# Patient Record
Sex: FEMALE | Race: WHITE
[De-identification: names, ages, dates, MRNs, and addresses within clinical notes are randomized per-mention and may not be internally consistent; named-entity substitution may affect disease eponyms.]

---

## 2021-12-17 ENCOUNTER — HOSPITAL ENCOUNTER (EMERGENCY)
Dept: HOSPITAL 47 - EC | Age: 81
Discharge: OTHER INSTITUTION | End: 2021-12-17
Payer: COMMERCIAL

## 2021-12-17 VITALS — DIASTOLIC BLOOD PRESSURE: 74 MMHG | HEART RATE: 68 BPM | RESPIRATION RATE: 28 BRPM | SYSTOLIC BLOOD PRESSURE: 143 MMHG

## 2021-12-17 VITALS — TEMPERATURE: 95.5 F

## 2021-12-17 DIAGNOSIS — I47.2: ICD-10-CM

## 2021-12-17 DIAGNOSIS — I10: ICD-10-CM

## 2021-12-17 DIAGNOSIS — K55.8: ICD-10-CM

## 2021-12-17 DIAGNOSIS — Z20.822: ICD-10-CM

## 2021-12-17 DIAGNOSIS — D72.829: ICD-10-CM

## 2021-12-17 DIAGNOSIS — I71.4: Primary | ICD-10-CM

## 2021-12-17 DIAGNOSIS — E87.2: ICD-10-CM

## 2021-12-17 LAB
ALBUMIN SERPL-MCNC: 2 G/DL (ref 3.5–5)
ALP SERPL-CCNC: 59 U/L (ref 38–126)
ALT SERPL-CCNC: 20 U/L (ref 4–34)
ANION GAP SERPL CALC-SCNC: 18 MMOL/L
APTT BLD: 35.5 SEC (ref 22–30)
AST SERPL-CCNC: 20 U/L (ref 14–36)
BASOPHILS # BLD AUTO: 0.1 K/UL (ref 0–0.2)
BASOPHILS NFR BLD AUTO: 0 %
BUN SERPL-SCNC: 14 MG/DL (ref 7–17)
CALCIUM SPEC-MCNC: 7.4 MG/DL (ref 8.4–10.2)
CHLORIDE SERPL-SCNC: 113 MMOL/L (ref 98–107)
CO2 BLDA-SCNC: 14 MMOL/L (ref 19–24)
CO2 SERPL-SCNC: 7 MMOL/L (ref 22–30)
EOSINOPHIL # BLD AUTO: 0 K/UL (ref 0–0.7)
EOSINOPHIL NFR BLD AUTO: 0 %
ERYTHROCYTE [DISTWIDTH] IN BLOOD BY AUTOMATED COUNT: 2.42 M/UL (ref 3.8–5.4)
ERYTHROCYTE [DISTWIDTH] IN BLOOD: 15.9 % (ref 11.5–15.5)
GLUCOSE BLD-MCNC: 316 MG/DL (ref 75–99)
GLUCOSE SERPL-MCNC: 276 MG/DL (ref 74–99)
HCO3 BLDA-SCNC: 13 MMOL/L (ref 21–25)
HCT VFR BLD AUTO: 23.3 % (ref 34–46)
HGB BLD-MCNC: 6.6 GM/DL (ref 11.4–16)
INR PPP: 1.2 (ref ?–1.2)
LIPASE SERPL-CCNC: 62 U/L (ref 23–300)
LYMPHOCYTES # SPEC AUTO: 3.4 K/UL (ref 1–4.8)
LYMPHOCYTES NFR SPEC AUTO: 20 %
MAGNESIUM SPEC-SCNC: 2.2 MG/DL (ref 1.6–2.3)
MCH RBC QN AUTO: 27.3 PG (ref 25–35)
MCHC RBC AUTO-ENTMCNC: 28.4 G/DL (ref 31–37)
MCV RBC AUTO: 96.2 FL (ref 80–100)
MONOCYTES # BLD AUTO: 0.9 K/UL (ref 0–1)
MONOCYTES NFR BLD AUTO: 5 %
NEUTROPHILS # BLD AUTO: 11.9 K/UL (ref 1.3–7.7)
NEUTROPHILS NFR BLD AUTO: 72 %
PCO2 BLDA: 30 MMHG (ref 35–45)
PH BLDA: 7.25 [PH] (ref 7.35–7.45)
PLATELET # BLD AUTO: 241 K/UL (ref 150–450)
PO2 BLDA: 390 MMHG (ref 83–108)
POTASSIUM SERPL-SCNC: 5.1 MMOL/L (ref 3.5–5.1)
PROT SERPL-MCNC: 4.4 G/DL (ref 6.3–8.2)
PT BLD: 12.4 SEC (ref 9–12)
SODIUM SERPL-SCNC: 138 MMOL/L (ref 137–145)
WBC # BLD AUTO: 16.5 K/UL (ref 3.8–10.6)

## 2021-12-17 PROCEDURE — 86850 RBC ANTIBODY SCREEN: CPT

## 2021-12-17 PROCEDURE — 96376 TX/PRO/DX INJ SAME DRUG ADON: CPT

## 2021-12-17 PROCEDURE — 99292 CRITICAL CARE ADDL 30 MIN: CPT

## 2021-12-17 PROCEDURE — 96375 TX/PRO/DX INJ NEW DRUG ADDON: CPT

## 2021-12-17 PROCEDURE — 83690 ASSAY OF LIPASE: CPT

## 2021-12-17 PROCEDURE — 96365 THER/PROPH/DIAG IV INF INIT: CPT

## 2021-12-17 PROCEDURE — 36600 WITHDRAWAL OF ARTERIAL BLOOD: CPT

## 2021-12-17 PROCEDURE — 85730 THROMBOPLASTIN TIME PARTIAL: CPT

## 2021-12-17 PROCEDURE — 70450 CT HEAD/BRAIN W/O DYE: CPT

## 2021-12-17 PROCEDURE — 72125 CT NECK SPINE W/O DYE: CPT

## 2021-12-17 PROCEDURE — 82272 OCCULT BLD FECES 1-3 TESTS: CPT

## 2021-12-17 PROCEDURE — 85025 COMPLETE CBC W/AUTO DIFF WBC: CPT

## 2021-12-17 PROCEDURE — 80053 COMPREHEN METABOLIC PANEL: CPT

## 2021-12-17 PROCEDURE — 93005 ELECTROCARDIOGRAM TRACING: CPT

## 2021-12-17 PROCEDURE — 82805 BLOOD GASES W/O2 SATURATION: CPT

## 2021-12-17 PROCEDURE — 83735 ASSAY OF MAGNESIUM: CPT

## 2021-12-17 PROCEDURE — 86901 BLOOD TYPING SEROLOGIC RH(D): CPT

## 2021-12-17 PROCEDURE — 85610 PROTHROMBIN TIME: CPT

## 2021-12-17 PROCEDURE — 72170 X-RAY EXAM OF PELVIS: CPT

## 2021-12-17 PROCEDURE — 36415 COLL VENOUS BLD VENIPUNCTURE: CPT

## 2021-12-17 PROCEDURE — 99291 CRITICAL CARE FIRST HOUR: CPT

## 2021-12-17 PROCEDURE — 74174 CTA ABD&PLVS W/CONTRAST: CPT

## 2021-12-17 PROCEDURE — 71045 X-RAY EXAM CHEST 1 VIEW: CPT

## 2021-12-17 PROCEDURE — 84484 ASSAY OF TROPONIN QUANT: CPT

## 2021-12-17 PROCEDURE — 86900 BLOOD TYPING SEROLOGIC ABO: CPT

## 2021-12-17 PROCEDURE — 87635 SARS-COV-2 COVID-19 AMP PRB: CPT

## 2021-12-17 PROCEDURE — 82271 OCCULT BLOOD OTHER SOURCES: CPT

## 2021-12-17 PROCEDURE — 83605 ASSAY OF LACTIC ACID: CPT

## 2021-12-17 PROCEDURE — 86920 COMPATIBILITY TEST SPIN: CPT

## 2021-12-17 RX ADMIN — MIDAZOLAM HYDROCHLORIDE PRN MG: 1 INJECTION, SOLUTION INTRAMUSCULAR; INTRAVENOUS at 17:51

## 2021-12-17 RX ADMIN — MIDAZOLAM HYDROCHLORIDE PRN MG: 1 INJECTION, SOLUTION INTRAMUSCULAR; INTRAVENOUS at 16:42

## 2021-12-17 NOTE — ED
GI Bleed HPI





- General


Chief complaint: GI Bleed


Stated complaint: Unresponsive,GI Bleed


Source: EMS


Mode of arrival: EMS





- History of Present Illness


Initial comments: 


81 year old female presents with past medical history of hypertension and 

cataracts who presents to the emergency department.  She called EMS for 

shortness of breath.  EMS arrived on scene and found a sick-appearing female 

that had a bloody emesis on her shirt.  She stood up from her chair and promptly

went unresponsive.  They got her in the back of the ambulance and she was found 

to be bradycardic and hypotensive.  She was completely unresponsive and 

therefore was intubated using an 8 cm ET tube, 25 cm at the lip.  She was given 

2 doses of push dose epi and a dose of atropine with improvement in her blood 

pressures.  She had seizure-like activity after intubation and therefore was 

given 5 mg of Versed by paramedic. She arrives to us with a pulse is placed on 

the vent.  Son presents to the hospital and states that he came home from work 

and found her on well on the couch.  He notes some abdominal pain.  She was 

having some vomiting which was grossly bloody.  She has not had any complaints 

previous to this.  No previous cardiac history.  The remainder of the HPI is 

limited








- Related Data


                                Home Medications











 Medication  Instructions  Recorded  Confirmed


 


Metoprolol Tartrate [Lopressor] 50 mg PO HS 07/14/16 12/17/21


 


cloNIDine HCL [Catapres] 0.2 mg PO BID 07/14/16 12/17/21


 


Ipratropium-Albuterol Nebulize 3 ml INHALATION RT-QID PRN 12/17/21 12/17/21





[Duoneb 0.5 mg-3 mg/3 ml Soln]   


 


amLODIPine [Norvasc] 5 mg PO HS 12/17/21 12/17/21











                                    Allergies











Allergy/AdvReac Type Severity Reaction Status Date / Time


 


procaine HCl [From Novocain] Allergy  passed out Verified 12/17/21 14:52





   after  





   novocain  





   in dentist  





   office  














Review of Systems


ROS Statement: 


Those systems with pertinent positive or pertinent negative responses have been 

documented in the HPI.





ROS Other: All systems not noted in ROS Statement are negative.





Past Medical History


Past Medical History: Eye Disorder, Hypertension


Additional Past Medical History / Comment(s): CATARACTS


History of Any Multi-Drug Resistant Organisms: None Reported


Past Surgical History: Tonsillectomy


Additional Past Surgical History / Comment(s): LUMP REMOVED FROM RIGHT ARM.  LT 

CATARACT REMOVED


Past Anesthesia/Blood Transfusion Reactions: No Reported Reaction


Additional Past Anesthesia/Blood Transfusion Reaction / Comment(s): PASSED OUT 

AFTER NOVOCAINE


Past Psychological History: No Psychological Hx Reported


Smoking Status: Unknown if ever smoked


Past Alcohol Use History: None Reported


Past Drug Use History: None Reported





- Past Family History


  ** Mother


Family Medical History: Diabetes Mellitus, Myocardial Infarction (MI)





  ** Father


Additional Family Medical History / Comment(s): AORTIC ANEURYSM





  ** Sister(s)


Family Medical History: Diabetes Mellitus





  ** Daughter(s)


Family Medical History: Diabetes Mellitus





General Exam


Limitations: altered mental status, physical limitation


General appearance: obtunded, in distress


Head exam: Present: atraumatic, normocephalic, normal inspection


Eye exam: Present: other (4 mm, non reactive)


Respiratory exam: Present: normal lung sounds bilaterally, other (bagged).  

Absent: respiratory distress, wheezes, rales, rhonchi, stridor


Cardiovascular Exam: Present: regular rate, irregular rhythm (multiple PVCs with

intermittent episodes of nonsustained V. tach), normal heart sounds.  Absent: 

systolic murmur, diastolic murmur, rubs, gallop, clicks


GI/Abdominal exam: Present: distended, tenderness, guarding, diminished bowel 

sounds


Rectal exam: Present: decreased rectal tone, heme (-) stool, hemorrhoids


Extremities exam: Present: normal inspection, full ROM, normal capillary refill.

 Absent: tenderness, pedal edema, joint swelling, calf tenderness


Neurological exam: Present: altered


Skin exam: Present: pallor





Course


                                   Vital Signs











  12/17/21 12/17/21 12/17/21





  14:35 14:48 14:53


 


Temperature  91.9 F L 93.6 F L


 


Pulse Rate 99 108 H 105 H


 


Respiratory 18 14 





Rate   


 


Blood Pressure 141/89 149/112 92/78


 


O2 Sat by Pulse 100  





Oximetry   














  12/17/21 12/17/21 12/17/21





  15:01 15:06 15:45


 


Temperature 94.5 F L 94.6 F L 94.3 F L


 


Pulse Rate 89 77 65


 


Respiratory 14 14 20





Rate   


 


Blood Pressure 99/80 112/63 93/52


 


O2 Sat by Pulse 99 100 100





Oximetry   














  12/17/21 12/17/21 12/17/21





  15:56 16:08 16:18


 


Temperature  94.5 F L 94.3 F L


 


Pulse Rate 65 65 63


 


Respiratory 20 20 22





Rate   


 


Blood Pressure 81/42 90/53 113/67


 


O2 Sat by Pulse 100 100 





Oximetry   














  12/17/21 12/17/21 12/17/21





  16:25 16:31 16:41


 


Temperature 94.3 F L 94.1 F L 94.1 F L


 


Pulse Rate 64 64 61


 


Respiratory 22 20 20





Rate   


 


Blood Pressure 110/65 109/66 125/74


 


O2 Sat by Pulse 100 100 100





Oximetry   














  12/17/21 12/17/21 12/17/21





  17:21 18:21 18:48


 


Temperature   95.5 F L


 


Pulse Rate 65 79 68


 


Respiratory 16 20 28 H





Rate   


 


Blood Pressure 106/71 112/66 143/74


 


O2 Sat by Pulse 100 100 100





Oximetry   














- Reevaluation(s)


Reevaluation #1: 


Speaking with Dr. Quezada - looking at images and will call back


12/17/21 1415


Reevaluation #2: 


Speaking with Dr. Quezada - does not believe the patient has a ruptured AAA 

but ischemic bowel.  Recommending general surgery consult or transfer to higher 

level of care ideally


12/17/21 1647








Reevaluation #3: 





12/17/21 17:39


Calling [facility] for transfer. 





12/17/21 17:52


Not accepting transfers


Reevaluation #4: 


Spoke with Dr. Chavez - states he will not operate on the patient without a 

vascular surgeon available and capable of handling ruptured AAA


12/17/21 17:54





Spoke with Dr. Quezada - believes this is a general surgery issue and needs to

be deferred to general surgeon





12/17/21 20:36





Reevaluation #5: 





12/17/21 18:28


[facility] called back - spoke with Dr. Wiley - patient will be transported to

[facility] CAT 1 





Medical Decision Making





- Medical Decision Making





On arrival patient is placed in trauma bay 1.  A thorough exam is performed.  

She does have stable blood pressure and pulse at this time.  A bedside ultrasoun

d is performed which does demonstrate an enlarged aorta measuring 5.0 cm. 

abdomen is tense.  Laboratory studies are conducted.  Patient did receive 2 L of

fluid prehospital.  Patient is type and crossmatch for 2 units.  OG tube was 

placed which does have 500 mL of dark bloody fluid return.  Patient does go for 

CT of her brain, cervical spine as well as chest abdomen pelvis angio.  Patient 

does have an episode of nonsustained V. tach.  She was given 150 mL bolus of 

amiodarone.  3 studies are obtained white count is 16.5.  Hemoccult and 6.6.  

INR 1.2.  CO2 is 7.  A lactic acid 14.2.  Stool occult is negative for blood.  

Gastric occult is positive.  CT of the brain and x-rays of acute renal hem

orrhage, mass effect or midline shift.  Patient does have some movement on the 

vent and therefore 2 mg of Versed is ordered every 2 hours.  CT of the patient's

abdomen and pelvis demonstrates abdominal aortic aneurysm rupture with abnormal 

heterogenicity outside the calcified rim of the AAA.  Internal air with an 

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  

Abnormal wall thickening prominence of small bowel loops throughout the abdomen 

into the pelvis concerning for developing ischemic change.  I did call and speak

with Dr. Quezada.  He feels that the patient is suffering from ischemic bowel 

and not ruptured AAA.  I spoke then with Dr. Chavez who is concerned for AAA.  

Both surgeons feel that the patient needs higher level of care. I did call and 

leave the patients info with Benji Dalal.  A call was then made to [facility] who 

refused patient.  I was on the line with Benoit when I did receive a call back

from Benji Dalal accepting the patient.  Accepting physician is Dr. Wiley and 

Dr. Syed - patient will go to [facility]. Sons updated as to poor prognosis. 





- Lab Data


Result diagrams: 


                                 12/17/21 14:35





                                 12/17/21 14:35


                                   Lab Results











  12/17/21 12/17/21 12/17/21 Range/Units





  14:35 14:35 14:35 


 


WBC  16.5 H    (3.8-10.6)  k/uL


 


RBC  2.42 L    (3.80-5.40)  m/uL


 


Hgb  6.6 L*    (11.4-16.0)  gm/dL


 


Hct  23.3 L    (34.0-46.0)  %


 


MCV  96.2    (80.0-100.0)  fL


 


MCH  27.3    (25.0-35.0)  pg


 


MCHC  28.4 L    (31.0-37.0)  g/dL


 


RDW  15.9 H    (11.5-15.5)  %


 


Plt Count  241    (150-450)  k/uL


 


MPV  8.4    


 


Neutrophils %  72    %


 


Lymphocytes %  20    %


 


Monocytes %  5    %


 


Eosinophils %  0    %


 


Basophils %  0    %


 


Neutrophils #  11.9 H    (1.3-7.7)  k/uL


 


Lymphocytes #  3.4    (1.0-4.8)  k/uL


 


Monocytes #  0.9    (0-1.0)  k/uL


 


Eosinophils #  0.0    (0-0.7)  k/uL


 


Basophils #  0.1    (0-0.2)  k/uL


 


Hypochromasia  Marked    


 


PT   12.4 H   (9.0-12.0)  sec


 


INR   1.2 H   (<1.2)  


 


APTT   35.5 H   (22.0-30.0)  sec


 


Sample Site     


 


ABG pH     (7.35-7.45)  


 


ABG pCO2     (35-45)  mmHg


 


ABG pO2     ()  mmHg


 


ABG HCO3     (21-25)  mmol/L


 


ABG Total CO2     (19-24)  mmol/L


 


ABG O2 Saturation     (94-97)  %


 


ABG Base Excess     mmol/L


 


Piotr Test     


 


FiO2     %


 


Sodium    138  (137-145)  mmol/L


 


Potassium    5.1  (3.5-5.1)  mmol/L


 


Chloride    113 H  ()  mmol/L


 


Carbon Dioxide    7 L*  (22-30)  mmol/L


 


Anion Gap    18  mmol/L


 


BUN    14  (7-17)  mg/dL


 


Creatinine    1.09 H  (0.52-1.04)  mg/dL


 


Est GFR (CKD-EPI)AfAm    56  (>60 ml/min/1.73 sqM)  


 


Est GFR (CKD-EPI)NonAf    49  (>60 ml/min/1.73 sqM)  


 


Glucose    276 H  (74-99)  mg/dL


 


POC Glucose (mg/dL)     (75-99)  mg/dL


 


POC Glu Operater ID     


 


Lactic Ac Sepsis Rflx     


 


Plasma Lactic Acid Luis Miguel     (0.7-2.0)  mmol/L


 


Calcium    7.4 L  (8.4-10.2)  mg/dL


 


Magnesium    2.2  (1.6-2.3)  mg/dL


 


Total Bilirubin    0.3  (0.2-1.3)  mg/dL


 


AST    20  (14-36)  U/L


 


ALT    20  (4-34)  U/L


 


Alkaline Phosphatase    59  ()  U/L


 


Troponin I     (0.000-0.034)  ng/mL


 


Total Protein    4.4 L  (6.3-8.2)  g/dL


 


Albumin    2.0 L  (3.5-5.0)  g/dL


 


Lipase    62  ()  U/L


 


Gastric Occult Blood     (Negative)  


 


Stool Occult Blood     (Negative)  


 


Coronavirus (PCR)     (Not Detectd)  


 


Blood Type     


 


Blood Type Recheck     


 


Bld Type Recheck Status     


 


Antibody Screen     


 


Crossmatch     


 


Spec Expiration Date     














  12/17/21 12/17/21 12/17/21 Range/Units





  14:35 14:35 14:35 


 


WBC     (3.8-10.6)  k/uL


 


RBC     (3.80-5.40)  m/uL


 


Hgb     (11.4-16.0)  gm/dL


 


Hct     (34.0-46.0)  %


 


MCV     (80.0-100.0)  fL


 


MCH     (25.0-35.0)  pg


 


MCHC     (31.0-37.0)  g/dL


 


RDW     (11.5-15.5)  %


 


Plt Count     (150-450)  k/uL


 


MPV     


 


Neutrophils %     %


 


Lymphocytes %     %


 


Monocytes %     %


 


Eosinophils %     %


 


Basophils %     %


 


Neutrophils #     (1.3-7.7)  k/uL


 


Lymphocytes #     (1.0-4.8)  k/uL


 


Monocytes #     (0-1.0)  k/uL


 


Eosinophils #     (0-0.7)  k/uL


 


Basophils #     (0-0.2)  k/uL


 


Hypochromasia     


 


PT     (9.0-12.0)  sec


 


INR     (<1.2)  


 


APTT     (22.0-30.0)  sec


 


Sample Site     


 


ABG pH     (7.35-7.45)  


 


ABG pCO2     (35-45)  mmHg


 


ABG pO2     ()  mmHg


 


ABG HCO3     (21-25)  mmol/L


 


ABG Total CO2     (19-24)  mmol/L


 


ABG O2 Saturation     (94-97)  %


 


ABG Base Excess     mmol/L


 


Piotr Test     


 


FiO2     %


 


Sodium     (137-145)  mmol/L


 


Potassium     (3.5-5.1)  mmol/L


 


Chloride     ()  mmol/L


 


Carbon Dioxide     (22-30)  mmol/L


 


Anion Gap     mmol/L


 


BUN     (7-17)  mg/dL


 


Creatinine     (0.52-1.04)  mg/dL


 


Est GFR (CKD-EPI)AfAm     (>60 ml/min/1.73 sqM)  


 


Est GFR (CKD-EPI)NonAf     (>60 ml/min/1.73 sqM)  


 


Glucose     (74-99)  mg/dL


 


POC Glucose (mg/dL)     (75-99)  mg/dL


 


POC Glu Operater ID     


 


Lactic Ac Sepsis Rflx     


 


Plasma Lactic Acid Luis Miguel  14.2 H*    (0.7-2.0)  mmol/L


 


Calcium     (8.4-10.2)  mg/dL


 


Magnesium     (1.6-2.3)  mg/dL


 


Total Bilirubin     (0.2-1.3)  mg/dL


 


AST     (14-36)  U/L


 


ALT     (4-34)  U/L


 


Alkaline Phosphatase     ()  U/L


 


Troponin I   0.019   (0.000-0.034)  ng/mL


 


Total Protein     (6.3-8.2)  g/dL


 


Albumin     (3.5-5.0)  g/dL


 


Lipase     ()  U/L


 


Gastric Occult Blood     (Negative)  


 


Stool Occult Blood    Negative  (Negative)  


 


Coronavirus (PCR)     (Not Detectd)  


 


Blood Type     


 


Blood Type Recheck     


 


Bld Type Recheck Status     


 


Antibody Screen     


 


Crossmatch     


 


Spec Expiration Date     














  12/17/21 12/17/21 12/17/21 Range/Units





  14:35 14:37 15:00 


 


WBC     (3.8-10.6)  k/uL


 


RBC     (3.80-5.40)  m/uL


 


Hgb     (11.4-16.0)  gm/dL


 


Hct     (34.0-46.0)  %


 


MCV     (80.0-100.0)  fL


 


MCH     (25.0-35.0)  pg


 


MCHC     (31.0-37.0)  g/dL


 


RDW     (11.5-15.5)  %


 


Plt Count     (150-450)  k/uL


 


MPV     


 


Neutrophils %     %


 


Lymphocytes %     %


 


Monocytes %     %


 


Eosinophils %     %


 


Basophils %     %


 


Neutrophils #     (1.3-7.7)  k/uL


 


Lymphocytes #     (1.0-4.8)  k/uL


 


Monocytes #     (0-1.0)  k/uL


 


Eosinophils #     (0-0.7)  k/uL


 


Basophils #     (0-0.2)  k/uL


 


Hypochromasia     


 


PT     (9.0-12.0)  sec


 


INR     (<1.2)  


 


APTT     (22.0-30.0)  sec


 


Sample Site     


 


ABG pH     (7.35-7.45)  


 


ABG pCO2     (35-45)  mmHg


 


ABG pO2     ()  mmHg


 


ABG HCO3     (21-25)  mmol/L


 


ABG Total CO2     (19-24)  mmol/L


 


ABG O2 Saturation     (94-97)  %


 


ABG Base Excess     mmol/L


 


Piotr Test     


 


FiO2     %


 


Sodium     (137-145)  mmol/L


 


Potassium     (3.5-5.1)  mmol/L


 


Chloride     ()  mmol/L


 


Carbon Dioxide     (22-30)  mmol/L


 


Anion Gap     mmol/L


 


BUN     (7-17)  mg/dL


 


Creatinine     (0.52-1.04)  mg/dL


 


Est GFR (CKD-EPI)AfAm     (>60 ml/min/1.73 sqM)  


 


Est GFR (CKD-EPI)NonAf     (>60 ml/min/1.73 sqM)  


 


Glucose     (74-99)  mg/dL


 


POC Glucose (mg/dL)   316 H   (75-99)  mg/dL


 


POC Glu Operater ID   Viki Mccarthy   


 


Lactic Ac Sepsis Rflx     


 


Plasma Lactic Acid Luis Miguel     (0.7-2.0)  mmol/L


 


Calcium     (8.4-10.2)  mg/dL


 


Magnesium     (1.6-2.3)  mg/dL


 


Total Bilirubin     (0.2-1.3)  mg/dL


 


AST     (14-36)  U/L


 


ALT     (4-34)  U/L


 


Alkaline Phosphatase     ()  U/L


 


Troponin I     (0.000-0.034)  ng/mL


 


Total Protein     (6.3-8.2)  g/dL


 


Albumin     (3.5-5.0)  g/dL


 


Lipase     ()  U/L


 


Gastric Occult Blood    Positive  (Negative)  


 


Stool Occult Blood     (Negative)  


 


Coronavirus (PCR)     (Not Detectd)  


 


Blood Type  O Negative    


 


Blood Type Recheck  O Neg    


 


Bld Type Recheck Status  No    


 


Antibody Screen  NEGATIVE    


 


Crossmatch  See Detail    


 


Spec Expiration Date  12/20/2021 - 2335 12/17/21 12/17/21 12/17/21 Range/Units





  15:30 16:17 18:20 


 


WBC     (3.8-10.6)  k/uL


 


RBC     (3.80-5.40)  m/uL


 


Hgb     (11.4-16.0)  gm/dL


 


Hct     (34.0-46.0)  %


 


MCV     (80.0-100.0)  fL


 


MCH     (25.0-35.0)  pg


 


MCHC     (31.0-37.0)  g/dL


 


RDW     (11.5-15.5)  %


 


Plt Count     (150-450)  k/uL


 


MPV     


 


Neutrophils %     %


 


Lymphocytes %     %


 


Monocytes %     %


 


Eosinophils %     %


 


Basophils %     %


 


Neutrophils #     (1.3-7.7)  k/uL


 


Lymphocytes #     (1.0-4.8)  k/uL


 


Monocytes #     (0-1.0)  k/uL


 


Eosinophils #     (0-0.7)  k/uL


 


Basophils #     (0-0.2)  k/uL


 


Hypochromasia     


 


PT     (9.0-12.0)  sec


 


INR     (<1.2)  


 


APTT     (22.0-30.0)  sec


 


Sample Site   RAD   


 


ABG pH   7.25 L   (7.35-7.45)  


 


ABG pCO2   30 L   (35-45)  mmHg


 


ABG pO2   390 H   ()  mmHg


 


ABG HCO3   13 L   (21-25)  mmol/L


 


ABG Total CO2   14 L   (19-24)  mmol/L


 


ABG O2 Saturation   100.0 H   (94-97)  %


 


ABG Base Excess   -14.3   mmol/L


 


Piotr Test   Yes   


 


FiO2   100   %


 


Sodium     (137-145)  mmol/L


 


Potassium     (3.5-5.1)  mmol/L


 


Chloride     ()  mmol/L


 


Carbon Dioxide     (22-30)  mmol/L


 


Anion Gap     mmol/L


 


BUN     (7-17)  mg/dL


 


Creatinine     (0.52-1.04)  mg/dL


 


Est GFR (CKD-EPI)AfAm     (>60 ml/min/1.73 sqM)  


 


Est GFR (CKD-EPI)NonAf     (>60 ml/min/1.73 sqM)  


 


Glucose     (74-99)  mg/dL


 


POC Glucose (mg/dL)     (75-99)  mg/dL


 


POC Glu Operater ID     


 


Lactic Ac Sepsis Rflx  Y    


 


Plasma Lactic Acid Luis Miguel     (0.7-2.0)  mmol/L


 


Calcium     (8.4-10.2)  mg/dL


 


Magnesium     (1.6-2.3)  mg/dL


 


Total Bilirubin     (0.2-1.3)  mg/dL


 


AST     (14-36)  U/L


 


ALT     (4-34)  U/L


 


Alkaline Phosphatase     ()  U/L


 


Troponin I     (0.000-0.034)  ng/mL


 


Total Protein     (6.3-8.2)  g/dL


 


Albumin     (3.5-5.0)  g/dL


 


Lipase     ()  U/L


 


Gastric Occult Blood     (Negative)  


 


Stool Occult Blood     (Negative)  


 


Coronavirus (PCR)    Not Detected  (Not Detectd)  


 


Blood Type     


 


Blood Type Recheck     


 


Bld Type Recheck Status     


 


Antibody Screen     


 


Crossmatch     


 


Spec Expiration Date     














- EKG Data


EKG Comments: 


EKG demonstrates a sinus rhythm with PVCs.  Rate of 98.  NY interval 162.  QRS 

126.  QTC of 469.  ST depression V2 through V6 as well as 2, 3 and aVF





Critical Care Time


Critical Care Time: Yes


Critical Care Time: 


110 minutes of critical care time





Disposition


Clinical Impression: 


 AAA (abdominal aortic aneurysm), Ischemic bowel disease, Lactic acid acidosis, 

Leukocytosis, Nonsustained ventricular tachycardia





Disposition: OTHER INSTITUTION NOT DEFINED


Condition: Critical


Is patient prescribed a controlled substance at d/c from ED?: No


Referrals: 


Delio Curry MD [Primary Care Provider] - 1-2 days





- Out of Hospital Transfer - Req. Specs


Out of Hospital Transfer - Requested Specifics: Other Emergency Center ([facility] - CAT 1)

## 2021-12-17 NOTE — P.GSCN
History of Present Illness


Consult date: 12/17/21


History of present illness: 





Patient seen and examined at bedside. She was intubated but responsive. She was 

found down at home with large amount of blood being coughed up per sons who are 

at the bedside with her. There is an NGT in place with continuous blood bieng 

suctioned at this time. Canister full of blood. General surgery was consulted 

for concern for ischemic bowel. 





Past Medical History


Past Medical History: Eye Disorder, Hypertension


Additional Past Medical History / Comment(s): CATARACTS


History of Any Multi-Drug Resistant Organisms: None Reported


Past Surgical History: Tonsillectomy


Additional Past Surgical History / Comment(s): LUMP REMOVED FROM RIGHT ARM.  LT 

CATARACT REMOVED


Past Anesthesia/Blood Transfusion Reactions: No Reported Reaction


Additional Past Anesthesia/Blood Transfusion Reaction / Comm: PASSED OUT AFTER 

NOVOCAINE


Past Psychological History: No Psychological Hx Reported


Smoking Status: Unknown if ever smoked


Past Alcohol Use History: None Reported


Past Drug Use History: None Reported





- Past Family History


  ** Mother


Family Medical History: Diabetes Mellitus, Myocardial Infarction (MI)





  ** Father


Additional Family Medical History / Comment(s): AORTIC ANEURYSM





  ** Sister(s)


Family Medical History: Diabetes Mellitus





  ** Daughter(s)


Family Medical History: Diabetes Mellitus





Medications and Allergies


                                Home Medications











 Medication  Instructions  Recorded  Confirmed  Type


 


Metoprolol Tartrate [Lopressor] 50 mg PO HS 07/14/16 12/17/21 History


 


cloNIDine HCL [Catapres] 0.2 mg PO BID 07/14/16 12/17/21 History


 


Ipratropium-Albuterol Nebulize 3 ml INHALATION RT-QID PRN 12/17/21 12/17/21 

History





[Duoneb 0.5 mg-3 mg/3 ml Soln]    


 


amLODIPine [Norvasc] 5 mg PO HS 12/17/21 12/17/21 History








                                    Allergies











Allergy/AdvReac Type Severity Reaction Status Date / Time


 


procaine HCl [From Novocain] Allergy  passed out Verified 12/17/21 14:52





   after  





   novocain  





   in dentist  





   office  














Surgical - Exam


Osteopathic Statement: *.  No significant issues noted on an osteopathic struc

tural exam other than those noted in the History and Physical/Consult.


                                   Vital Signs











Pulse Resp BP Pulse Ox


 


 99   18   141/89   100 


 


 12/17/21 14:35  12/17/21 14:35  12/17/21 14:35  12/17/21 14:35














- General


moderate distress





- Eyes


PERRL





- Neck


trachea midline





- Respiratory





intubated





- Abdomen





Soft, Mild distention, tender to palpation mid epigastric but no peritoneal 

signs. No rebound no rigidity no guarding





- Psychiatric





responds but is on vent





Results





- Labs





                                 12/17/21 14:35





                                 12/17/21 14:35


                  Abnormal Lab Results - Last 24 Hours (Table)











  12/17/21 12/17/21 12/17/21 Range/Units





  14:35 14:35 14:35 


 


WBC  16.5 H    (3.8-10.6)  k/uL


 


RBC  2.42 L    (3.80-5.40)  m/uL


 


Hgb  6.6 L*    (11.4-16.0)  gm/dL


 


Hct  23.3 L    (34.0-46.0)  %


 


MCHC  28.4 L    (31.0-37.0)  g/dL


 


RDW  15.9 H    (11.5-15.5)  %


 


Neutrophils #  11.9 H    (1.3-7.7)  k/uL


 


PT   12.4 H   (9.0-12.0)  sec


 


INR   1.2 H   (<1.2)  


 


APTT   35.5 H   (22.0-30.0)  sec


 


ABG pH     (7.35-7.45)  


 


ABG pCO2     (35-45)  mmHg


 


ABG pO2     ()  mmHg


 


ABG HCO3     (21-25)  mmol/L


 


ABG Total CO2     (19-24)  mmol/L


 


ABG O2 Saturation     (94-97)  %


 


Chloride    113 H  ()  mmol/L


 


Carbon Dioxide    7 L*  (22-30)  mmol/L


 


Creatinine    1.09 H  (0.52-1.04)  mg/dL


 


Glucose    276 H  (74-99)  mg/dL


 


POC Glucose (mg/dL)     (75-99)  mg/dL


 


Plasma Lactic Acid Luis Miguel     (0.7-2.0)  mmol/L


 


Calcium    7.4 L  (8.4-10.2)  mg/dL


 


Total Protein    4.4 L  (6.3-8.2)  g/dL


 


Albumin    2.0 L  (3.5-5.0)  g/dL


 


Crossmatch     














  12/17/21 12/17/21 12/17/21 Range/Units





  14:35 14:35 14:37 


 


WBC     (3.8-10.6)  k/uL


 


RBC     (3.80-5.40)  m/uL


 


Hgb     (11.4-16.0)  gm/dL


 


Hct     (34.0-46.0)  %


 


MCHC     (31.0-37.0)  g/dL


 


RDW     (11.5-15.5)  %


 


Neutrophils #     (1.3-7.7)  k/uL


 


PT     (9.0-12.0)  sec


 


INR     (<1.2)  


 


APTT     (22.0-30.0)  sec


 


ABG pH     (7.35-7.45)  


 


ABG pCO2     (35-45)  mmHg


 


ABG pO2     ()  mmHg


 


ABG HCO3     (21-25)  mmol/L


 


ABG Total CO2     (19-24)  mmol/L


 


ABG O2 Saturation     (94-97)  %


 


Chloride     ()  mmol/L


 


Carbon Dioxide     (22-30)  mmol/L


 


Creatinine     (0.52-1.04)  mg/dL


 


Glucose     (74-99)  mg/dL


 


POC Glucose (mg/dL)    316 H  (75-99)  mg/dL


 


Plasma Lactic Acid Luis Miguel  14.2 H*    (0.7-2.0)  mmol/L


 


Calcium     (8.4-10.2)  mg/dL


 


Total Protein     (6.3-8.2)  g/dL


 


Albumin     (3.5-5.0)  g/dL


 


Crossmatch   See Detail   














  12/17/21 Range/Units





  16:17 


 


WBC   (3.8-10.6)  k/uL


 


RBC   (3.80-5.40)  m/uL


 


Hgb   (11.4-16.0)  gm/dL


 


Hct   (34.0-46.0)  %


 


MCHC   (31.0-37.0)  g/dL


 


RDW   (11.5-15.5)  %


 


Neutrophils #   (1.3-7.7)  k/uL


 


PT   (9.0-12.0)  sec


 


INR   (<1.2)  


 


APTT   (22.0-30.0)  sec


 


ABG pH  7.25 L  (7.35-7.45)  


 


ABG pCO2  30 L  (35-45)  mmHg


 


ABG pO2  390 H  ()  mmHg


 


ABG HCO3  13 L  (21-25)  mmol/L


 


ABG Total CO2  14 L  (19-24)  mmol/L


 


ABG O2 Saturation  100.0 H  (94-97)  %


 


Chloride   ()  mmol/L


 


Carbon Dioxide   (22-30)  mmol/L


 


Creatinine   (0.52-1.04)  mg/dL


 


Glucose   (74-99)  mg/dL


 


POC Glucose (mg/dL)   (75-99)  mg/dL


 


Plasma Lactic Acid Luis Miguel   (0.7-2.0)  mmol/L


 


Calcium   (8.4-10.2)  mg/dL


 


Total Protein   (6.3-8.2)  g/dL


 


Albumin   (3.5-5.0)  g/dL


 


Crossmatch   








                                 Diabetes panel











  12/17/21 Range/Units





  14:35 


 


Sodium  138  (137-145)  mmol/L


 


Potassium  5.1  (3.5-5.1)  mmol/L


 


Chloride  113 H  ()  mmol/L


 


Carbon Dioxide  7 L*  (22-30)  mmol/L


 


BUN  14  (7-17)  mg/dL


 


Creatinine  1.09 H  (0.52-1.04)  mg/dL


 


Glucose  276 H  (74-99)  mg/dL


 


Calcium  7.4 L  (8.4-10.2)  mg/dL


 


AST  20  (14-36)  U/L


 


ALT  20  (4-34)  U/L


 


Alkaline Phosphatase  59  ()  U/L


 


Total Protein  4.4 L  (6.3-8.2)  g/dL


 


Albumin  2.0 L  (3.5-5.0)  g/dL








                                  Calcium panel











  12/17/21 Range/Units





  14:35 


 


Calcium  7.4 L  (8.4-10.2)  mg/dL


 


Albumin  2.0 L  (3.5-5.0)  g/dL








                                 Pituitary panel











  12/17/21 Range/Units





  14:35 


 


Sodium  138  (137-145)  mmol/L


 


Potassium  5.1  (3.5-5.1)  mmol/L


 


Chloride  113 H  ()  mmol/L


 


Carbon Dioxide  7 L*  (22-30)  mmol/L


 


BUN  14  (7-17)  mg/dL


 


Creatinine  1.09 H  (0.52-1.04)  mg/dL


 


Glucose  276 H  (74-99)  mg/dL


 


Calcium  7.4 L  (8.4-10.2)  mg/dL








                                  Adrenal panel











  12/17/21 Range/Units





  14:35 


 


Sodium  138  (137-145)  mmol/L


 


Potassium  5.1  (3.5-5.1)  mmol/L


 


Chloride  113 H  ()  mmol/L


 


Carbon Dioxide  7 L*  (22-30)  mmol/L


 


BUN  14  (7-17)  mg/dL


 


Creatinine  1.09 H  (0.52-1.04)  mg/dL


 


Glucose  276 H  (74-99)  mg/dL


 


Calcium  7.4 L  (8.4-10.2)  mg/dL


 


Total Bilirubin  0.3  (0.2-1.3)  mg/dL


 


AST  20  (14-36)  U/L


 


ALT  20  (4-34)  U/L


 


Alkaline Phosphatase  59  ()  U/L


 


Total Protein  4.4 L  (6.3-8.2)  g/dL


 


Albumin  2.0 L  (3.5-5.0)  g/dL














Assessment and Plan


Assessment: 


gi bleed 


Aorto-enteric fistula


Plan: 





Patient has aorto-enteric fistula seen and read on CT with air within the lumen 

of the aorta at the level of the duodenum. Also concern for ruptured AAA. She 

does have some thickened small bowel, this is mostly duodenum and proximal 

jejunum. At this time Patients primary problem is the large upper gi bleed 

likely secondary to the aorto-enteric fistula. I recommended urgent vascular 

surgery evaluation. I recommend aggressive resuscitation and transfer to a 

higher level of care.

## 2021-12-17 NOTE — CT
EXAMINATION TYPE: CT brain cspine wo con

 

DATE OF EXAM: 12/17/2021

 

COMPARISON:

 

HISTORY: unresponsive

 

CT DLP: 1369 mGycm

Automated exposure control for dose reduction was used.

 

TECHNIQUE: CT scan of the head and cervical spine are performed without contrast.

 

FINDINGS:   There is no acute intracranial hemorrhage, mass effect, or midline shift identified. Gene
ralized degenerative change noted and there is atherosclerotic vascular. Low-attenuation white matter
 are suggestive of vascular ischemia. Ventricular ischemia not excluded. Correlate with MRI or midlin
e shift or mass effect. The orbits are symmetric. Craniocervical junction maintained.

 

ET and NG tube noted. Odontoid appears intact. There is multilevel severe degenerative disc disease a
nd facet arthropathy. Assessment spinal canal nondiagnostic due to resolution and artifact. Multileve
l canal stenosis and foraminal encroachment. No definite acute fracture. Carotid artery atherosclerot
ic changes are noted. Ectasia of the visualized thoracic aorta. Similar changes involving the lung ap
ices with right pleural thickening. There is a deformity of the inferior margin of the clivus.

 

IMPRESSION:

1. There is no acute fracture or dislocation evident in the cervical spine. Multilevel severe degener
ative disc disease and facet arthropathy. Multilevel canal stenosis suspected.

2. No acute intracranial hemorrhage, mass effect, or midline shift is seen. Degenerative and nonspeci
fic white matter changes most typical remote ischemia.

## 2021-12-17 NOTE — CT
EXAMINATION TYPE: CT angio abdomen pelvis

 

DATE OF EXAM: 12/17/2021

 

HISTORY: unresponsive, rule out dissection. GI bleed. Anemia.

 

CT DLP: 1096.3mGycm  Automated Exposure Control for Dose Reduction was Utilized.

 

CONTRAST: 

CTA scan of the abdomen and pelvis is performed without oral and without and with IV Contrast, patien
t injected with 100 mL of Isovue 370. Three-D reconstructed images are created on an independent work
station and reviewed.

 

COMPARISON: None.

 

FINDINGS:

 

Vascular: There is AAA with rim calcification showing abnormal heterogeneous density surrounding the 
rim calcification. There are foci of internal air within the AAA. Aneurysm measures up to 5.3 cm guerin
sversely axial image 29. Postcontrast images show patency through the iliac bifurcation with signific
ant portion showing nonopacification. No aneurysm extension into the iliac arteries bilaterally. Shana
re calcified plaque into the iliac branch vessels is seen. Significant stenosis in the external iliac
 arteries bilaterally is likely present. Patent celiac artery and SMA before the aneurysm. No free ai
r. No portal venous air.

 

LUNG BASES: Small to tiny pericardial effusion. Coronary artery calcification and/or stents are seen.


 

LIVER/GB: No significant abnormality is appreciated.

 

PANCREAS: Moderate fat replacement atrophy of pancreas.

 

SPLEEN: No significant abnormality is seen.

 

ADRENALS: No significant abnormality is seen.

 

KIDNEYS: Some cortical thinning bilaterally with a few simple-appearing thin-walled cysts scattered t
hroughout both kidneys. Monroe catheter decompresses bladder.

 

BOWEL:  Prominent fluid filled small bowel loops with moderate concentric wall thickening.

 

UTERUS/ADNEXA: No gross abnormality seen.

 

LYMPH NODES: No greater than 1cm abdominal or pelvic lymph nodes are appreciated.

 

OSSEOUS STRUCTURES: No significant abnormality is seen.

 

OTHER: Exam slightly suboptimal as delayed phase imaging not performed.

 

IMPRESSION: Suboptimal study but there is evidence of abdominal aortic aneurysm rupture with abnormal
 heterogeneous density outside the rim calcified AAA. Internal air within aortic lumen is concerning 
for fistulous communication to the adjacent duodenum. Abnormal wall thickening and prominence of smal
l bowel loops throughout the abdomen into pelvis is concerning for developing ischemic change. 

 

Critical results communicated to ordering ER physician via telephone at time of dictation

## 2021-12-17 NOTE — XR
EXAMINATION TYPE: XR pelvis AP view

 

DATE OF EXAM: 12/17/2021

 

CLINICAL HISTORY: Unresponsive. GI bleed.

 

TECHNIQUE: A single AP view of the pelvis is obtained.

 

COMPARISON: None.

 

FINDINGS: Osseous structures are demineralized which is noted to lower radiographic sensitivity. Over
lying Monroe catheter is felt present. Moderate axial joint space loss both hips with moderate to loraine
re bilateral spurring. Pubic symphysis is intact. Sacroiliac joints are preserved. Moderate to severe
 overlying arterial vascular calcification. No acute displaced pelvic fracture identified.

 

IMPRESSION: As above.

## 2021-12-17 NOTE — XR
EXAMINATION TYPE: XR chest 1V portable

 

DATE OF EXAM: 12/17/2021

 

COMPARISON: NONE

 

HISTORY: Pain

 

TECHNIQUE: Single frontal view of the chest is obtained.

 

FINDINGS:  There is no focal air space opacity, pleural effusion, or pneumothorax seen.  The cardiac 
silhouette size is within normal limits.   The osseous structures are intact. ET and NG tube noted. H
eart is prominent. Hyperinflation suggests COPD thoracic aorta is enlarged measuring 4.8 cm.

 

IMPRESSION:  

1. COPD with cardiomegaly correlate for thoracic aortic aneurysm

## 2025-03-01 ENCOUNTER — HOSPITAL ENCOUNTER (INPATIENT)
Dept: HOSPITAL 47 - EC | Age: 85
LOS: 1 days | DRG: 951 | End: 2025-03-02
Attending: FAMILY MEDICINE | Admitting: FAMILY MEDICINE
Payer: MEDICARE

## 2025-03-01 DIAGNOSIS — Z98.41: ICD-10-CM

## 2025-03-01 DIAGNOSIS — I11.0: ICD-10-CM

## 2025-03-01 DIAGNOSIS — Z98.42: ICD-10-CM

## 2025-03-01 DIAGNOSIS — Z82.49: ICD-10-CM

## 2025-03-01 DIAGNOSIS — I50.9: ICD-10-CM

## 2025-03-01 DIAGNOSIS — T68.XXXA: ICD-10-CM

## 2025-03-01 DIAGNOSIS — Z51.5: Primary | ICD-10-CM

## 2025-03-01 DIAGNOSIS — Z88.8: ICD-10-CM

## 2025-03-01 DIAGNOSIS — E87.20: ICD-10-CM

## 2025-03-01 DIAGNOSIS — Z66: ICD-10-CM

## 2025-03-01 DIAGNOSIS — N17.9: ICD-10-CM

## 2025-03-01 LAB
ALBUMIN SERPL-MCNC: 2.9 G/DL (ref 3.5–5)
ALP SERPL-CCNC: 89 U/L (ref 38–126)
ALT SERPL-CCNC: 25 U/L (ref 4–34)
ANION GAP SERPL CALC-SCNC: 14 MMOL/L
APTT BLD: 31.8 SEC (ref 22–30)
AST SERPL-CCNC: 49 U/L (ref 14–36)
BASOPHILS # BLD AUTO: 0 K/UL (ref 0–0.2)
BASOPHILS NFR BLD AUTO: 0 %
BUN SERPL-SCNC: 48 MG/DL (ref 7–17)
CALCIUM SPEC-MCNC: 8.4 MG/DL (ref 8.4–10.2)
CHLORIDE SERPL-SCNC: 99 MMOL/L (ref 98–107)
CO2 SERPL-SCNC: 22 MMOL/L (ref 22–30)
EOSINOPHIL # BLD AUTO: 0 K/UL (ref 0–0.7)
EOSINOPHIL NFR BLD AUTO: 0 %
ERYTHROCYTE [DISTWIDTH] IN BLOOD BY AUTOMATED COUNT: 3.72 M/UL (ref 3.8–5.4)
ERYTHROCYTE [DISTWIDTH] IN BLOOD: 17.4 % (ref 11.5–15.5)
GLUCOSE BLD-MCNC: 217 MG/DL (ref 70–110)
GLUCOSE SERPL-MCNC: 186 MG/DL (ref 74–99)
HCO3 BLDV-SCNC: 19 MMOL/L (ref 24–28)
HCT VFR BLD AUTO: 33.8 % (ref 34–46)
HGB BLD-MCNC: 9.7 GM/DL (ref 11.4–16)
INR PPP: 1.2 (ref ?–1.2)
LYMPHOCYTES # SPEC AUTO: 0.7 K/UL (ref 1–4.8)
LYMPHOCYTES NFR SPEC AUTO: 6 %
MAGNESIUM SPEC-SCNC: 2.4 MG/DL (ref 1.6–2.3)
MCH RBC QN AUTO: 26.2 PG (ref 25–35)
MCHC RBC AUTO-ENTMCNC: 28.8 G/DL (ref 31–37)
MCV RBC AUTO: 90.9 FL (ref 80–100)
MONOCYTES # BLD AUTO: 0.6 K/UL (ref 0–1)
MONOCYTES NFR BLD AUTO: 5 %
NEUTROPHILS # BLD AUTO: 10.5 K/UL (ref 1.3–7.7)
NEUTROPHILS NFR BLD AUTO: 88 %
NT-PROBNP SERPL-MCNC: (no result) PG/ML
PCO2 BLDV: 59 MMHG (ref 37–51)
PH BLDV: 7.12 [PH] (ref 7.31–7.41)
PH UR: 5 [PH] (ref 5–8)
PLATELET # BLD AUTO: 72 K/UL (ref 150–450)
POTASSIUM SERPL-SCNC: 5 MMOL/L (ref 3.5–5.1)
PROT SERPL-MCNC: 5.6 G/DL (ref 6.3–8.2)
PT BLD: 12.9 SEC (ref 10–12.5)
SODIUM SERPL-SCNC: 135 MMOL/L (ref 137–145)
SP GR UR: 1.01 (ref 1–1.03)
T4 FREE SERPL-MCNC: 1.29 NG/DL (ref 0.78–2.19)
UROBILINOGEN UR QL STRIP: <2 MG/DL (ref ?–2)
WBC # BLD AUTO: 11.9 K/UL (ref 3.8–10.6)

## 2025-03-01 PROCEDURE — 85025 COMPLETE CBC W/AUTO DIFF WBC: CPT

## 2025-03-01 PROCEDURE — 84484 ASSAY OF TROPONIN QUANT: CPT

## 2025-03-01 PROCEDURE — 85730 THROMBOPLASTIN TIME PARTIAL: CPT

## 2025-03-01 PROCEDURE — 96361 HYDRATE IV INFUSION ADD-ON: CPT

## 2025-03-01 PROCEDURE — 80053 COMPREHEN METABOLIC PANEL: CPT

## 2025-03-01 PROCEDURE — 96375 TX/PRO/DX INJ NEW DRUG ADDON: CPT

## 2025-03-01 PROCEDURE — 84439 ASSAY OF FREE THYROXINE: CPT

## 2025-03-01 PROCEDURE — 83735 ASSAY OF MAGNESIUM: CPT

## 2025-03-01 PROCEDURE — 99291 CRITICAL CARE FIRST HOUR: CPT

## 2025-03-01 PROCEDURE — 36415 COLL VENOUS BLD VENIPUNCTURE: CPT

## 2025-03-01 PROCEDURE — 96376 TX/PRO/DX INJ SAME DRUG ADON: CPT

## 2025-03-01 PROCEDURE — 93005 ELECTROCARDIOGRAM TRACING: CPT

## 2025-03-01 PROCEDURE — 96365 THER/PROPH/DIAG IV INF INIT: CPT

## 2025-03-01 PROCEDURE — 84443 ASSAY THYROID STIM HORMONE: CPT

## 2025-03-01 PROCEDURE — 85610 PROTHROMBIN TIME: CPT

## 2025-03-01 PROCEDURE — 71045 X-RAY EXAM CHEST 1 VIEW: CPT

## 2025-03-01 PROCEDURE — 83605 ASSAY OF LACTIC ACID: CPT

## 2025-03-01 PROCEDURE — 83880 ASSAY OF NATRIURETIC PEPTIDE: CPT

## 2025-03-01 PROCEDURE — 82803 BLOOD GASES ANY COMBINATION: CPT

## 2025-03-01 PROCEDURE — 81003 URINALYSIS AUTO W/O SCOPE: CPT

## 2025-03-01 PROCEDURE — 87040 BLOOD CULTURE FOR BACTERIA: CPT

## 2025-03-01 PROCEDURE — 51702 INSERT TEMP BLADDER CATH: CPT

## 2025-03-01 RX ADMIN — CEFAZOLIN SCH MLS/HR: 330 INJECTION, POWDER, FOR SOLUTION INTRAMUSCULAR; INTRAVENOUS at 17:29

## 2025-03-01 RX ADMIN — CEFAZOLIN STA MLS/HR: 330 INJECTION, POWDER, FOR SOLUTION INTRAMUSCULAR; INTRAVENOUS at 15:37

## 2025-03-01 RX ADMIN — ONDANSETRON PRN MG: 2 INJECTION INTRAMUSCULAR; INTRAVENOUS at 18:42

## 2025-03-01 RX ADMIN — METHYLPREDNISOLONE SODIUM SUCCINATE STA MG: 125 INJECTION, POWDER, FOR SOLUTION INTRAMUSCULAR; INTRAVENOUS at 17:29

## 2025-03-01 RX ADMIN — MORPHINE SULFATE PRN MG: 2 INJECTION, SOLUTION INTRAMUSCULAR; INTRAVENOUS at 18:42

## 2025-03-01 NOTE — ED
General Adult HPI





- General


Chief complaint: Shortness of Breath


Stated complaint: AMS


Time Seen by Provider: 03/01/25 15:25


Source: patient, EMS, RN notes reviewed, old records reviewed


Mode of arrival: EMS


Limitations: altered mental status





- History of Present Illness


Initial comments: 





84-year-old female presenting with altered mental status.  History very limited 

on this patient, paramedics were unable to obtain IV access, unable to obtain 

blood pressure prior to arrival.  Apparently the patient became acutely altered 

within the past several hours.  Family stated to paramedics that they thought 

that the patient was dying.  And stated that her wishes were to be a DNR.





- Related Data


                                Home Medications











 Medication  Instructions  Recorded  Confirmed


 


Metoprolol Tartrate [Lopressor] 50 mg PO HS 07/14/16 12/17/21


 


cloNIDine HCL [Catapres] 0.2 mg PO BID 07/14/16 12/17/21


 


Ipratropium-Albuterol Nebulize 3 ml INHALATION RT-QID PRN 12/17/21 12/17/21





[Duoneb 0.5 mg-3 mg/3 ml Soln]   


 


amLODIPine [Norvasc] 5 mg PO HS 12/17/21 12/17/21











                                    Allergies











Allergy/AdvReac Type Severity Reaction Status Date / Time


 


procaine HCl [From Novocain] Allergy  passed out Verified 12/17/21 14:52





   after  





   novocain  





   in dentist  





   office  














Review of Systems


ROS Statement: 


Those systems with pertinent positive or pertinent negative responses have been 

documented in the HPI.





ROS Other: All systems not noted in ROS Statement are negative.





Past Medical History


Past Medical History: Eye Disorder, Hypertension


Additional Past Medical History / Comment(s): CATARACTS


History of Any Multi-Drug Resistant Organisms: None Reported


Past Surgical History: Tonsillectomy


Additional Past Surgical History / Comment(s): LUMP REMOVED FROM RIGHT ARM.  LT 

CATARACT REMOVED


Past Anesthesia/Blood Transfusion Reactions: No Reported Reaction


Additional Past Anesthesia/Blood Transfusion Reaction / Comment(s): PASSED OUT 

AFTER NOVOCAINE


Past Psychological History: No Psychological Hx Reported


Smoking Status: Unknown if ever smoked


Past Alcohol Use History: None Reported


Past Drug Use History: None Reported





- Past Family History


  ** Mother


Family Medical History: Diabetes Mellitus, Myocardial Infarction (MI)





  ** Father


Additional Family Medical History / Comment(s): AORTIC ANEURYSM





  ** Sister(s)


Family Medical History: Diabetes Mellitus





  ** Daughter(s)


Family Medical History: Diabetes Mellitus





General Exam


Limitations: altered mental status


General appearance: lethargic, in distress


Eye exam: Present: normal appearance, PERRL


ENT exam: Present: mucous membranes dry


Neck exam: Present: normal inspection.  Absent: tenderness, meningismus


Respiratory exam: Present: respiratory distress, rhonchi


Cardiovascular Exam: Present: regular rate, normal rhythm


GI/Abdominal exam: Present: soft.  Absent: distended, tenderness


Extremities exam: Present: pedal edema


Neurological exam: Absent: alert, oriented X3


Skin exam: Present: cyanosis





Course


                                   Vital Signs











  03/01/25 03/01/25 03/01/25





  15:23 15:45 16:07


 


Temperature 86.2 F L  


 


Pulse Rate 70  


 


Respiratory 24 22 





Rate   


 


Blood Pressure 61/37  


 


O2 Sat by Pulse 88 L  





Oximetry   


 


Fraction of   80





Inspired Oxygen   





(FIO2)   














  03/01/25 03/01/25





  16:26 17:51


 


Temperature 86.5 F L 30.6 F L


 


Pulse Rate 70 78


 


Respiratory  





Rate  


 


Blood Pressure 75/65 93/48


 


O2 Sat by Pulse  94 L





Oximetry  


 


Fraction of  





Inspired Oxygen  





(FIO2)  














- Reevaluation(s)


Reevaluation #1: 





03/01/25 160


I confirmed with the son and the patient's goals of care.  He states that she 

would not want aggressive measures, no life support, no ventilator, no surgery.








Medical Decision Making





- Medical Decision Making





Was pt. sent in by a medical professional or institution (, PA, NP, urgent 

care, hospital, or nursing home...) When possible be specific


@  -No


Did you speak to anyone other than the patient for history (EMS, parent, family,

police, friend...)? What history was obtained from this source 


@  -Case discussed at length with both sons were present, patient is a DNR and 

ultimately decision made for comfort measures.


Did you review nursing and triage notes (agree or disagree)?  Why? 


@  -I reviewed and agree with nursing and triage notes


Were old charts reviewed (outside hosp., previous admission, EMS record, old 

EKG, old radiological studies, urgent care reports/EKG's, nursing home records)?

Report findings 


@  -No old charts were reviewed


Differential Altered Mental Status:


Hypoglycemia, DKA, hypercapnia, ETOH, overdose, CO poisoning, trauma, myxedema 

coma, HTN encephalopathy, infection, encephalitis, psychosis, intercranial 

hemorrhage, hepatic encephalopathy, meningitis, CVA, this is not meant to be an 

all-inclusive list





EKG interpreted by me (3pts min.).


@  -Paced rhythm rate of 76 QRS duration 257, QTc 637


X-rays interpreted by me (1pt min.).


@  -None done


CT interpreted by me (1pt min.).


@  -None done


U/S interpreted by me (1pt. min.).


@  -None done


What testing was considered but not performed or refused? (CT, X-rays, U/S, 

labs)? Why?


@  -None


What meds were considered but not given or refused? Why?


@  -None


Did you discuss the management of the patient with other professionals 

(professionals i.e. , PA, NP, lab, RT, psych nurse, , , 

teacher, , )? Give summary


@Case discussed with Dr. Curry who will admit


Was smoking cessation discussed for >3mins.?


@  -No


Was critical care preformed (if so, how long)?


@Yes 35 minutes


Were there social determinants of health that impacted care today? How? 

(Homelessness, low income, unemployed, alcoholism, drug addiction, 

transportation, low edu. Level, literacy, decrease access to med. care, retirement, 

rehab)?


@  -No


Was there de-escalation of care discussed even if they declined (Discuss DNR or 

withdrawal of care, Hospice)? DNR status


@  -No


What co-morbidities impacted this encounter? (DM, HTN, Smoking, COPD, CAD, 

Cancer, CVA, ARF, Chemo, Hep., AIDS, mental health diagnosis, sleep apnea, 

morbid obesity)?


@Hypertension, aortic aneurysm


Was patient admitted / discharged? Hospital course, mention meds given and 

route, prescriptions, significant lab abnormalities, going to OR and other 

pertinent info.


@  -84-year-old female presenting in extremis.  Patient is hypoxic, hypothermic,

hypotensive.  She is edematous throughout with increased work of breathing.  

Initial resuscitation is administered and family is contacted regarding the 

goals of care.  Son does indicate that the patient is a DNR and would not like 

aggressive measures.  Patient did receive laboratory testing in the emergency 

department which showed significant abnormalities including a high lactic acid, 

elevated troponin, significantly elevated BNP.  Chest x-ray shows bilateral 

pleural effusion.  Chest x-ray showing bilateral pleural effusion.  Patient was 

given prophylactic antibiotics, administered steroids and fluid bolus.  She is 

in multiorgan failure, congestive heart failure, renal failure.  TSH is 

elevated, free T4 is pending.  Ultimately decision is made to keep the patient 

comfortable.


Undiagnosed new problem with uncertain prognosis?


@  -No


Drug Therapy requiring intensive monitoring for toxicity (Heparin, Nitro, 

Insulin, Cardizem)?


@  -No


Were any procedures done?


@  -No


Diagnosis/symptom?


@  -Multiorgan failure, likely end-of-life, congestive heart failure, troponin 

elevation, AYAN, hypothermia


Acute, or Chronic, or Acute on Chronic?


@  -Acute


Uncomplicated (without systemic symptoms) or Complicated (systemic symptoms)?


@  -[Complicated


Side effects of treatment?


@  -No


Exacerbation, Progression, or Severe Exacerbation?


@  -No


Poses a threat to life or bodily function? How? (Chest pain, USA, MI, pneumonia,

 PE, COPD, DKA, ARF, appy, cholecystitis, CVA, Diverticulitis, Homicidal, 

Suicidal, threat to staff... and all critical care pts)


@Yes, likely end-of-life








- Lab Data


Result diagrams: 


                                 03/01/25 16:48





                                 03/01/25 15:58


                                   Lab Results











  03/01/25 03/01/25 03/01/25 Range/Units





  15:29 15:58 15:58 


 


WBC     (3.8-10.6)  k/uL


 


RBC     (3.80-5.40)  m/uL


 


Hgb     (11.4-16.0)  gm/dL


 


Hct     (34.0-46.0)  %


 


MCV     (80.0-100.0)  fL


 


MCH     (25.0-35.0)  pg


 


MCHC     (31.0-37.0)  g/dL


 


RDW     (11.5-15.5)  %


 


Plt Count     (150-450)  k/uL


 


MPV     


 


Neutrophils %     %


 


Lymphocytes %     %


 


Monocytes %     %


 


Eosinophils %     %


 


Basophils %     %


 


Neutrophils #     (1.3-7.7)  k/uL


 


Lymphocytes #     (1.0-4.8)  k/uL


 


Monocytes #     (0-1.0)  k/uL


 


Eosinophils #     (0-0.7)  k/uL


 


Basophils #     (0-0.2)  k/uL


 


Manual Slide Review     


 


Hypochromasia     


 


Anisocytosis     


 


PT   12.9 H   (10.0-12.5)  sec


 


INR   1.2 H   (<1.2)  


 


APTT   31.8 H   (22.0-30.0)  sec


 


VBG pH     (7.31-7.41)  


 


VBG pCO2     (37-51)  mmHg


 


VBG HCO3     (24-28)  mmol/L


 


Sodium    135 L  (137-145)  mmol/L


 


Potassium    5.0  (3.5-5.1)  mmol/L


 


Chloride    99  ()  mmol/L


 


Carbon Dioxide    22  (22-30)  mmol/L


 


Anion Gap    14  mmol/L


 


BUN    48 H  (7-17)  mg/dL


 


Creatinine    1.75 H  (0.52-1.04)  mg/dL


 


Est GFR (CKD-EPI)AfAm    31  (>60 ml/min/1.73 sqM)  


 


Est GFR (CKD-EPI)NonAf    27  (>60 ml/min/1.73 sqM)  


 


Glucose    186 H  (74-99)  mg/dL


 


POC Glucose (mg/dL)  217 H    ()  mg/dL


 


POC Glu Operater ID  [facility]    


 


Plasma Lactic Acid Luis Miguel     (0.7-2.0)  mmol/L


 


Calcium    8.4  (8.4-10.2)  mg/dL


 


Magnesium    2.4 H  (1.6-2.3)  mg/dL


 


Total Bilirubin    0.9  (0.2-1.3)  mg/dL


 


AST    49 H  (14-36)  U/L


 


ALT    25  (4-34)  U/L


 


Alkaline Phosphatase    89  ()  U/L


 


Troponin I     (0.000-0.034)  ng/mL


 


NT-Pro-B Natriuret Pep    93264  pg/mL


 


Total Protein    5.6 L  (6.3-8.2)  g/dL


 


Albumin    2.9 L  (3.5-5.0)  g/dL


 


TSH     (0.465-4.680)  mIU/L


 


Urine Color     


 


Urine Appearance     (Clear)  


 


Urine pH     (5.0-8.0)  


 


Ur Specific Gravity     (1.001-1.035)  


 


Urine Protein     (Negative)  


 


Urine Glucose (UA)     (Negative)  


 


Urine Ketones     (Negative)  


 


Urine Blood     (Negative)  


 


Urine Nitrite     (Negative)  


 


Urine Bilirubin     (Negative)  


 


Urine Urobilinogen     (<2.0)  mg/dL


 


Ur Leukocyte Esterase     (Negative)  














  03/01/25 03/01/25 03/01/25 Range/Units





  15:58 15:58 15:58 


 


WBC     (3.8-10.6)  k/uL


 


RBC     (3.80-5.40)  m/uL


 


Hgb     (11.4-16.0)  gm/dL


 


Hct     (34.0-46.0)  %


 


MCV     (80.0-100.0)  fL


 


MCH     (25.0-35.0)  pg


 


MCHC     (31.0-37.0)  g/dL


 


RDW     (11.5-15.5)  %


 


Plt Count     (150-450)  k/uL


 


MPV     


 


Neutrophils %     %


 


Lymphocytes %     %


 


Monocytes %     %


 


Eosinophils %     %


 


Basophils %     %


 


Neutrophils #     (1.3-7.7)  k/uL


 


Lymphocytes #     (1.0-4.8)  k/uL


 


Monocytes #     (0-1.0)  k/uL


 


Eosinophils #     (0-0.7)  k/uL


 


Basophils #     (0-0.2)  k/uL


 


Manual Slide Review     


 


Hypochromasia     


 


Anisocytosis     


 


PT     (10.0-12.5)  sec


 


INR     (<1.2)  


 


APTT     (22.0-30.0)  sec


 


VBG pH     (7.31-7.41)  


 


VBG pCO2     (37-51)  mmHg


 


VBG HCO3     (24-28)  mmol/L


 


Sodium     (137-145)  mmol/L


 


Potassium     (3.5-5.1)  mmol/L


 


Chloride     ()  mmol/L


 


Carbon Dioxide     (22-30)  mmol/L


 


Anion Gap     mmol/L


 


BUN     (7-17)  mg/dL


 


Creatinine     (0.52-1.04)  mg/dL


 


Est GFR (CKD-EPI)AfAm     (>60 ml/min/1.73 sqM)  


 


Est GFR (CKD-EPI)NonAf     (>60 ml/min/1.73 sqM)  


 


Glucose     (74-99)  mg/dL


 


POC Glucose (mg/dL)     ()  mg/dL


 


POC Glu Operater ID     


 


Plasma Lactic Acid Luis Miguel     (0.7-2.0)  mmol/L


 


Calcium     (8.4-10.2)  mg/dL


 


Magnesium     (1.6-2.3)  mg/dL


 


Total Bilirubin     (0.2-1.3)  mg/dL


 


AST     (14-36)  U/L


 


ALT     (4-34)  U/L


 


Alkaline Phosphatase     ()  U/L


 


Troponin I  0.055 H*    (0.000-0.034)  ng/mL


 


NT-Pro-B Natriuret Pep     pg/mL


 


Total Protein     (6.3-8.2)  g/dL


 


Albumin     (3.5-5.0)  g/dL


 


TSH    7.010 H  (0.465-4.680)  mIU/L


 


Urine Color   Light Yellow   


 


Urine Appearance   Clear   (Clear)  


 


Urine pH   5.0   (5.0-8.0)  


 


Ur Specific Gravity   1.013   (1.001-1.035)  


 


Urine Protein   Negative   (Negative)  


 


Urine Glucose (UA)   Negative   (Negative)  


 


Urine Ketones   Negative   (Negative)  


 


Urine Blood   Negative   (Negative)  


 


Urine Nitrite   Negative   (Negative)  


 


Urine Bilirubin   Negative   (Negative)  


 


Urine Urobilinogen   <2.0   (<2.0)  mg/dL


 


Ur Leukocyte Esterase   Negative   (Negative)  














  03/01/25 03/01/25 03/01/25 Range/Units





  16:48 16:48 16:48 


 


WBC  11.9 H    (3.8-10.6)  k/uL


 


RBC  3.72 L    (3.80-5.40)  m/uL


 


Hgb  9.7 L    (11.4-16.0)  gm/dL


 


Hct  33.8 L    (34.0-46.0)  %


 


MCV  90.9    (80.0-100.0)  fL


 


MCH  26.2    (25.0-35.0)  pg


 


MCHC  28.8 L    (31.0-37.0)  g/dL


 


RDW  17.4 H    (11.5-15.5)  %


 


Plt Count  72 L    (150-450)  k/uL


 


MPV  11.0    


 


Neutrophils %  88    %


 


Lymphocytes %  6    %


 


Monocytes %  5    %


 


Eosinophils %  0    %


 


Basophils %  0    %


 


Neutrophils #  10.5 H    (1.3-7.7)  k/uL


 


Lymphocytes #  0.7 L    (1.0-4.8)  k/uL


 


Monocytes #  0.6    (0-1.0)  k/uL


 


Eosinophils #  0.0    (0-0.7)  k/uL


 


Basophils #  0.0    (0-0.2)  k/uL


 


Manual Slide Review  Performed    


 


Hypochromasia  Marked    


 


Anisocytosis  Slight    


 


PT     (10.0-12.5)  sec


 


INR     (<1.2)  


 


APTT     (22.0-30.0)  sec


 


VBG pH   7.12 L*   (7.31-7.41)  


 


VBG pCO2   59 H   (37-51)  mmHg


 


VBG HCO3   19 L   (24-28)  mmol/L


 


Sodium     (137-145)  mmol/L


 


Potassium     (3.5-5.1)  mmol/L


 


Chloride     ()  mmol/L


 


Carbon Dioxide     (22-30)  mmol/L


 


Anion Gap     mmol/L


 


BUN     (7-17)  mg/dL


 


Creatinine     (0.52-1.04)  mg/dL


 


Est GFR (CKD-EPI)AfAm     (>60 ml/min/1.73 sqM)  


 


Est GFR (CKD-EPI)NonAf     (>60 ml/min/1.73 sqM)  


 


Glucose     (74-99)  mg/dL


 


POC Glucose (mg/dL)     ()  mg/dL


 


POC Glu Operater ID     


 


Plasma Lactic Acid Ulis Miguel    8.0 H*  (0.7-2.0)  mmol/L


 


Calcium     (8.4-10.2)  mg/dL


 


Magnesium     (1.6-2.3)  mg/dL


 


Total Bilirubin     (0.2-1.3)  mg/dL


 


AST     (14-36)  U/L


 


ALT     (4-34)  U/L


 


Alkaline Phosphatase     ()  U/L


 


Troponin I     (0.000-0.034)  ng/mL


 


NT-Pro-B Natriuret Pep     pg/mL


 


Total Protein     (6.3-8.2)  g/dL


 


Albumin     (3.5-5.0)  g/dL


 


TSH     (0.465-4.680)  mIU/L


 


Urine Color     


 


Urine Appearance     (Clear)  


 


Urine pH     (5.0-8.0)  


 


Ur Specific Gravity     (1.001-1.035)  


 


Urine Protein     (Negative)  


 


Urine Glucose (UA)     (Negative)  


 


Urine Ketones     (Negative)  


 


Urine Blood     (Negative)  


 


Urine Nitrite     (Negative)  


 


Urine Bilirubin     (Negative)  


 


Urine Urobilinogen     (<2.0)  mg/dL


 


Ur Leukocyte Esterase     (Negative)  














Critical Care Time


Critical Care Time: Yes


Total Critical Care Time: 35





Disposition


Clinical Impression: 


 Congestive heart failure, AYAN (acute kidney injury), Hypothermia, Lactic 

acidosis





Disposition: ADMITTED AS IP TO THIS HOSP


Condition: Poor


Is patient prescribed a controlled substance at d/c from ED?: No


Referrals: 


Antolin Curry MD [Primary Care Provider] - 1-2 days


Time of Disposition: 17:58

## 2025-03-01 NOTE — XR
EXAMINATION TYPE: XR chest 1V portable

 

DATE OF EXAM: 3/1/2025 4:31 PM

 

COMPARISON: Prior chest radiograph 12/17/2021.

 

CLINICAL INDICATION: Female, 84 years old with history of ru; PHH

 

TECHNIQUE: XR chest 1V portable Frontal view of the chest.

 

FINDINGS: 

Lungs/Pleura: Azygous lobe and fissure. Biapical scarring. Small bilateral pleural effusions and salazar
cent lower lobe compressive atelectasis. No definite pneumothorax.

Pulmonary vascularity: Prominence of the thoracic aortic arch.

Heart/mediastinum: Left chest wall cardiac pacer device. Cardiomegaly.

Musculoskeletal: No acute osseous pathology.

 

Other findings: None

 

 

IMPRESSION: 

Cardiomegaly and small bilateral pleural effusions suggesting pulmonary edema.

 

[facility], Workstation: [id number], 3/1/2025 4:37 PM

## 2025-03-02 VITALS — SYSTOLIC BLOOD PRESSURE: 104 MMHG | DIASTOLIC BLOOD PRESSURE: 84 MMHG | TEMPERATURE: 98.8 F
